# Patient Record
Sex: FEMALE | Race: WHITE | URBAN - METROPOLITAN AREA
[De-identification: names, ages, dates, MRNs, and addresses within clinical notes are randomized per-mention and may not be internally consistent; named-entity substitution may affect disease eponyms.]

---

## 2022-01-17 ENCOUNTER — TELEPHONE (OUTPATIENT)
Dept: FAMILY MEDICINE CLINIC | Age: 77
End: 2022-01-17

## 2022-01-17 NOTE — TELEPHONE ENCOUNTER
Name of Caller: Sneha Villalpando phone number: [phone]    Relationship to Patient: patient    Provider: Dr. Pierre Ibrahim:  Internal Medicine     Chief Complaint/Reason for Call: Patricia Zimmer called to advised, that her bottom opened up again, and they are treating it as needed and changing dressings. PT also gained 5 pounds in ten days     Best time of day caller can be reached: any       Patient advised that office/PCP has 24-48 business hours to return their call:  Yes